# Patient Record
(demographics unavailable — no encounter records)

---

## 2025-04-29 NOTE — PROCEDURE
[FreeTextEntry1] : Bilateral breast ultrasound [FreeTextEntry2] : Assess palpable finding left and ultrasound finding right [FreeTextEntry3] : The left 9:00 RA breast shows dilated ductal structures that account for palpable finding.  The right 12N2 breast shows an oval hypoechoic [id number] mm mass with slight posterior enhancement.

## 2025-04-29 NOTE — DATA REVIEWED
[FreeTextEntry1] : Bilateral mammogram 8/24/2024 ([facility]):  No mammographic evidence of malignancy.  Bilateral breast ultrasound 8/24/2024:  Extremely dense.  Right numerous scattered cysts in cyst clusters.  12N2 [id number] mm isoechoic nodule not previously seen. Left scattered cysts in cyst clusters.  Follow-up right breast ultrasound in 6 months.  Right breast ultrasound 2/22/2025: Numerous cysts.  12N2 1.3 cm isoechoic nodule increased in size, follow-up in 6 months.   (Images uploaded to PACS and reviewed.)

## 2025-04-29 NOTE — CONSULT LETTER
[Dear  ___] : Dear  [unfilled], [Consult Letter:] : I had the pleasure of evaluating your patient, [unfilled]. [Sincerely,] : Sincerely, [DrMello  ___] : Dr. NGUYỄN

## 2025-04-29 NOTE — PAST MEDICAL HISTORY
[Perimenopausal] : The patient is perimenopausal [Menarche Age ____] : age at menarche was [unfilled] [Approximately ___] : the LMP was approximately [unfilled] [Total Preg ___] : G[unfilled] [Live Births ___] : P[unfilled]  [Age At Live Birth ___] : Age at live birth: [unfilled] [History of Hormone Replacement Treatment] : has no history of hormone replacement treatment [FreeTextEntry2] : 2 sons, daughter (daughter was 1 month premature) [FreeTextEntry7] : x10 yrs: +Pills +Patch. [FreeTextEntry8] : x1 yr.

## 2025-04-29 NOTE — PHYSICAL EXAM
[Bra Size: ___] : Bra Size: [unfilled] [EOMI] : extra ocular movement intact [Sclera nonicteric] : sclera nonicteric [Supple] : supple [No Supraclavicular Adenopathy] : no supraclavicular adenopathy [No Cervical Adenopathy] : no cervical adenopathy [No Thyromegaly] : no thyromegaly [Clear to Auscultation Bilat] : clear to auscultation bilaterally [Normal Sinus Rhythm] : normal sinus rhythm [Normal S1, S2] : normal S1 and S2 [Examined in the supine and seated position] : examined in the supine and seated position [No dominant masses] : no dominant masses in right breast  [No Nipple Retraction] : no left nipple retraction [No Nipple Discharge] : no left nipple discharge [No Axillary Lymphadenopathy] : no left axillary lymphadenopathy [Soft] : abdomen soft [Not Tender] : non-tender [de-identified] : 5 mm nodular area 9:00 retroareola [de-identified] : R hand dominant.

## 2025-04-29 NOTE — DATA REVIEWED
[FreeTextEntry1] : Bilateral mammogram 8/24/2024 ([facility]):  No mammographic evidence of malignancy.  Bilateral breast ultrasound 8/24/2024:  Extremely dense.  Right numerous scattered cysts in cyst clusters.  12N2 [id number] mm isoechoic nodule not previously seen. Left scattered cysts in cyst clusters.  Follow-up right breast ultrasound in 6 months.  Right breast ultrasound 2/22/2025: Numerous cysts.  12N2 1.3 cm isoechoic nodule increased in size, follow-up in 6 months.   (Images uploaded to PACS and reviewed.)

## 2025-04-29 NOTE — PHYSICAL EXAM
[Bra Size: ___] : Bra Size: [unfilled] [EOMI] : extra ocular movement intact [Sclera nonicteric] : sclera nonicteric [Supple] : supple [No Supraclavicular Adenopathy] : no supraclavicular adenopathy [No Cervical Adenopathy] : no cervical adenopathy [No Thyromegaly] : no thyromegaly [Clear to Auscultation Bilat] : clear to auscultation bilaterally [Normal Sinus Rhythm] : normal sinus rhythm [Normal S1, S2] : normal S1 and S2 [Examined in the supine and seated position] : examined in the supine and seated position [No dominant masses] : no dominant masses in right breast  [No Nipple Retraction] : no left nipple retraction [No Nipple Discharge] : no left nipple discharge [No Axillary Lymphadenopathy] : no left axillary lymphadenopathy [Soft] : abdomen soft [Not Tender] : non-tender [de-identified] : 5 mm nodular area 9:00 retroareola [de-identified] : R hand dominant.

## 2025-04-29 NOTE — PROCEDURE
[FreeTextEntry1] : Bilateral breast ultrasound [FreeTextEntry2] : Assess palpable finding left and ultrasound finding right [FreeTextEntry3] : The left 9:00 RA breast shows dilated ductal structures that account for palpable finding.  The right 12N2 breast shows an oval hypoechoic [id number] mm mass with slight posterior enhancement.

## 2025-04-29 NOTE — HISTORY OF PRESENT ILLNESS
[FreeTextEntry1] : This is a 42 year old  female who complains of an abnormal finding on right breast ultrasound.  The patient had breast imaging in 2018 at [facility] and recalls being told there was something in the right breast.  She did not have breast imaging again until 8/2024. There was a finding on right breast ultrasound for which a six month follow-up was advised.  She had that done and it increased in size.  Another six month follow-up was advised.  Her mother in law is a breast cancer survivor and encouraged her to have further evaluation.  She does do BSE and hasn't felt anything.  The patient had a hysterectomy in 2018 for adenomyosis.  She still has her ovaries.  She carries a Factor V Leiden mutation which her mother and maternal grandfather also carry.  Her grandfather has had a DVT.   Breast Cancer Risk Assessment:  LASHAE 10 year 2.1%  Lifetime= 13.8%

## 2025-05-08 NOTE — PROCEDURE
[FreeTextEntry1] : Right breast core biopsy with clip placement [FreeTextEntry2] : Assess solid mass [FreeTextEntry3] : The right 12N2 breast was imaged. The area was prepped and draped.  Local 1% lidocaine was infiltrated.  A small nicj was made in the skin. From a medial to lateral approach, a 12 gauge Celero device was used to take 2 core samples.  A cork shaped clip was deployed.  Pressure was held.  The small nick was closed with Steristrips and a Telfa/Tegaderm dressing was applied.   The patient tolerated the procedure well.